# Patient Record
Sex: FEMALE | Race: WHITE
[De-identification: names, ages, dates, MRNs, and addresses within clinical notes are randomized per-mention and may not be internally consistent; named-entity substitution may affect disease eponyms.]

---

## 2020-07-03 ENCOUNTER — HOSPITAL ENCOUNTER (EMERGENCY)
Dept: HOSPITAL 60 - LB.ED | Age: 50
Discharge: HOME | End: 2020-07-03
Payer: MEDICAID

## 2020-07-03 DIAGNOSIS — K57.92: Primary | ICD-10-CM

## 2020-07-03 DIAGNOSIS — Z88.2: ICD-10-CM

## 2020-07-03 PROCEDURE — 85025 COMPLETE CBC W/AUTO DIFF WBC: CPT

## 2020-07-03 PROCEDURE — 83605 ASSAY OF LACTIC ACID: CPT

## 2020-07-03 PROCEDURE — 96374 THER/PROPH/DIAG INJ IV PUSH: CPT

## 2020-07-03 PROCEDURE — 99284 EMERGENCY DEPT VISIT MOD MDM: CPT

## 2020-07-03 PROCEDURE — 36415 COLL VENOUS BLD VENIPUNCTURE: CPT

## 2020-07-03 PROCEDURE — 80053 COMPREHEN METABOLIC PANEL: CPT

## 2020-07-03 PROCEDURE — 96375 TX/PRO/DX INJ NEW DRUG ADDON: CPT

## 2020-07-03 RX ADMIN — KETOROLAC TROMETHAMINE ONE MG: 30 INJECTION, SOLUTION INTRAMUSCULAR at 22:22

## 2020-07-03 RX ADMIN — ONDANSETRON ONE MG: 2 INJECTION, SOLUTION INTRAMUSCULAR; INTRAVENOUS at 22:22

## 2020-07-03 NOTE — EDM.PDOC
ED HPI GENERAL MEDICAL PROBLEM





- General


Chief Complaint: General


Stated Complaint: LEFT LOWER ABDOMINAL PAIN


Time Seen by Provider: 07/03/20 21:15


Source of Information: Reports: Patient


History Limitations: Reports: No Limitations





- History of Present Illness


INITIAL COMMENTS - FREE TEXT/NARRATIVE: 





Patient is a 48 y/o female, with PMHx significant for diverticulitis, who pres

ents with 1 day of LLQ pain that comes and goes and is sharp in nature.  

Associated nausea and constipation. Patient denies fever, vomiting, bloody 

stools, chest pain, SOB, or difficulty breathing/swallowing.  Patient was 

diagnosed with diverticulitis back in May and had a CT scan.  Symptoms are 

similar to her episode in May.  


  ** Left Lower Abdominal


Pain Score (Numeric/FACES): 7





- Related Data


                                    Allergies











Allergy/AdvReac Type Severity Reaction Status Date / Time


 


Sulfa (Sulfonamide Allergy Mild Hives Verified 07/03/20 22:16





Antibiotics)     














Past Medical History


Gastrointestinal History: Reports: Diverticulosis


Do You Give Correction Boluses or Sliding Scale: No





Social & Family History





- Family History


Family Medical History: Noncontributory





- Tobacco Use


Smoking Status *Q: Never Smoker





- Caffeine Use


Caffeine Use: Reports: None





- Recreational Drug Use


Recreational Drug Use: No





ED ROS GENERAL





- Review of Systems


Review Of Systems: Comprehensive ROS is negative, except as noted in HPI.





ED EXAM, GENERAL





- Physical Exam


Exam: See Below


Exam Limited By: No Limitations


General Appearance: Alert, No Apparent Distress


Respiratory/Chest: No Respiratory Distress, Lungs Clear, Normal Breath Sounds, 

No Accessory Muscle Use, Chest Non-Tender


Cardiovascular: Normal Peripheral Pulses, Regular Rate, Rhythm, No Edema, No 

Murmur


GI/Abdominal: Normal Bowel Sounds, Soft, No Distention


Skin Exam: Warm, Dry, Intact (LLQ tenderness to palpation)





Course





- Vital Signs


Text/Narrative:: 





Patient given 1 L NS bolus, zofran, and toradol.  She is feeling better.  Labs 

show leukocytosis.  


Last Recorded V/S: 





                                Last Vital Signs











Temp  37.4 C   07/03/20 22:39


 


Pulse  97   07/03/20 22:39


 


Resp  18   07/03/20 22:39


 


BP  128/84   07/03/20 22:39


 


Pulse Ox  99   07/03/20 22:39














- Orders/Labs/Meds


Orders: 





                               Active Orders 24 hr











 Category Date Time Status


 


 REFLEX LACTIC ACID YES OR NO [CHEM] Routine Lab  07/03/20 22:35 Received











Labs: 





                                Laboratory Tests











  07/03/20 07/03/20 07/03/20 Range/Units





  22:00 22:00 22:00 


 


WBC  12.1 H    (4.0-11.0)  K/uL


 


RBC  4.33    (3.80-5.80)  M/uL


 


Hgb  13.3    (11.5-16.5)  g/dL


 


Hct  38.3    (37.0-47.0)  %


 


MCV  89    (76-96)  fL


 


MCH  30.7    (27.0-32.0)  pg


 


MCHC  34.7    (31.0-35.0)  g/dL


 


RDW  13.4    (11.0-16.0)  %


 


Plt Count  116 L    (150-500)  K/uL


 


MPV  10.8 H    (6.0-10.0)  fL


 


Neut % (Auto)  73.8 H    (45.0-70.0)  %


 


Lymph % (Auto)  16.9 L    (20.0-40.0)  %


 


Mono % (Auto)  7.5    (3.0-10.0)  %


 


Eos % (Auto)  1.6    (1.0-5.0)  %


 


Baso % (Auto)  0.2    (0.0-0.5)  %


 


Neut # (Auto)  8.94 H    (2.00-7.50)  K/uL


 


Lymph # (Auto)  2.05    (1.50-4.00)  K/uL


 


Mono # (Auto)  0.91 H    (0.20-0.80)  K/uL


 


Eos # (Auto)  0.19    (0.04-0.40)  K/uL


 


Baso # (Auto)  0.03    (0.02-0.10)  K/uL


 


Sodium   137   (136-145)  mmol/L


 


Potassium   4.0   (3.5-5.1)  mmol/L


 


Chloride   103   ()  mmol/L


 


Carbon Dioxide   25.4   (21.0-32.0)  mmol/L


 


Anion Gap   12.6   (5.0-15.0)  mmol/L


 


BUN   9   (8-26)  mg/dL


 


Creatinine   0.80   (0.55-1.02)  mg/dL


 


Est Cr Clr Drug Dosing   83.63   mL/min


 


Estimated GFR (MDRD)   > 60   (>60)  MLS/MIN


 


BUN/Creatinine Ratio   11.3   (6-25)  


 


Glucose   101 H   ()  mg/dL


 


Lactic Acid    2.2 H  (0.4-2.0)  mmol/L


 


Calcium   8.3 L   (8.5-10.1)  mg/dL


 


Total Bilirubin   0.6   (0.0-1.0)  mg/dL


 


AST   31   (15-37)  U/L


 


ALT   48   (12-78)  U/L


 


Alkaline Phosphatase   82   ()  U/L


 


Total Protein   7.4   (6.4-8.2)  g/dL


 


Albumin   3.5   (3.4-5.0)  g/dL


 


Globulin   3.9   (2.2-4.2)  g/dL


 


Albumin/Globulin Ratio   0.9   (0.8-2.0)  











Meds: 





Medications














Discontinued Medications














Generic Name Dose Route Start Last Admin





  Trade Name Freq  PRN Reason Stop Dose Admin


 


Sodium Chloride  1,000 mls @ 1,000 mls/sec  07/03/20 21:55  07/03/20 22:28





  Normal Saline  IV  07/03/20 21:56  1,000 mls/sec





  .BOLUS ONE   Administration


 


Ketorolac Tromethamine  30 mg  07/03/20 21:57  07/03/20 22:22





  Toradol  IVPUSH  07/03/20 21:58  30 mg





  ONETIME ONE   Administration


 


Metronidazole  500 mg  07/03/20 23:22 





  Flagyl  PO  07/03/20 23:23 





  ONETIME ONE  


 


Ondansetron HCl  4 mg  07/03/20 21:58  07/03/20 22:22





  Zofran  IVPUSH  07/03/20 21:59  4 mg





  ONETIME ONE   Administration














Departure





- Departure


Time of Disposition: 23:40


Disposition: Home, Self-Care 01


Condition: Good


Clinical Impression: 


 Diverticulitis








- Discharge Information


*PRESCRIPTION DRUG MONITORING PROGRAM REVIEWED*: Not Applicable


*COPY OF PRESCRIPTION DRUG MONITORING REPORT IN PATIENT SAMMIE: Not Applicable


Instructions:  Diverticulitis, Easy-to-Read


Forms:  ED Department Discharge





Sepsis Event Note (ED)





- Evaluation


Sepsis Screening Result: No Definite Risk





- Focused Exam


Vital Signs: 





                                   Vital Signs











  Temp Pulse Resp BP Pulse Ox


 


 07/03/20 22:39  37.4 C  97  18  128/84  99














- My Orders


Last 24 Hours: 





My Active Orders





07/03/20 22:35


REFLEX LACTIC ACID YES OR NO [CHEM] Routine 














- Assessment/Plan


Last 24 Hours: 





My Active Orders





07/03/20 22:35


REFLEX LACTIC ACID YES OR NO [CHEM] Routine 











Plan: 





Will sent patient home with prescriptions of zofran, naproxen, and flagyl.  

First dose of flagyl given in ED.  Start naproxen tomorrow night with food.  

Probiotics daily while on antibiotics.  Return to the ED for fever >102, unable 

to tolerate fluids, difficulty breathing/swallowing, and/or persistent/worsening

 symptoms.